# Patient Record
Sex: MALE | Race: WHITE
[De-identification: names, ages, dates, MRNs, and addresses within clinical notes are randomized per-mention and may not be internally consistent; named-entity substitution may affect disease eponyms.]

---

## 2019-03-30 ENCOUNTER — HOSPITAL ENCOUNTER (EMERGENCY)
Dept: HOSPITAL 41 - JD.ED | Age: 49
Discharge: NURSING, REHAB OR LONG-TERM CARE | End: 2019-03-30
Payer: MEDICAID

## 2019-03-30 DIAGNOSIS — I10: ICD-10-CM

## 2019-03-30 DIAGNOSIS — I61.9: Primary | ICD-10-CM

## 2019-03-30 NOTE — EDM.PDOC
ED HPI GENERAL MEDICAL PROBLEM





- General


Chief Complaint: Neuro Symptoms/Deficits


Stated Complaint: STROKE SYMPTOMS


Time Seen by Provider: 03/30/19 08:42


Source of Information: Reports: Patient, Family, RN Notes Reviewed (Son)





- History of Present Illness


INITIAL COMMENTS - FREE TEXT/NARRATIVE: 





49-year-old male is brought here by son private vehicle  with complete right-

sided paralysis. Reported to have been up to the bathroom about 5 hours ago 

with no difficulty walking or focal weakness. And when he awakened about an 

hour later, about 4:30 mountain time or 4 hours ago his right hand and leg were 

"paralyzed". He was asymptomatic yesterday and last evening. He has never had 

any stroke type symptoms in the past. He does have history of hypertension, on 

medication for about 2 years. He does smoke and also does drink alcohol.  He is 

on no prescription blood thinners. His son states that he does take ibuprofen 

on a when necessary basis.  He has moderate headache this morning. He speaks 

[place], does seem to understand English. His son does speak fluent English and 

has been interpreting for us. He does answer questions immediately and does 

obey commands. He states that it seems difficult to speak clearly but his son 

states his words are appropriate, just mildly slurred.





- Related Data


 Allergies











Allergy/AdvReac Type Severity Reaction Status Date / Time


 


No Known Allergies Allergy   Verified 03/30/19 09:04














ED ROS GENERAL





- Review of Systems


Review Of Systems: See Below


Constitutional: Denies: Fever, Diaphoresis


HEENT: Denies: Throat Pain, Throat Swelling


Respiratory: Denies: Shortness of Breath, Wheezing, Cough


Cardiovascular: Denies: Chest Pain


GI/Abdominal: Denies: Abdominal Pain, Nausea, Vomiting


Musculoskeletal: Denies: Neck Pain, Back Pain


Skin: Reports: No Symptoms


Neurological: Reports: Trouble Speaking (Speech is mildly slurred), Difficulty 

Walking (Unable to ambulate for the past 4 hours), Weakness (Patient arrives 

with complete paralysis right and lower upper extremities)





ED EXAM, NEURO





- Physical Exam


Exam: See Below


General Appearance: Alert, Anxious


Eye Exam: Bilateral Eye: PERRL, Other (Extraocular motion is conjugate, no 

deviated gaze)


Ears: Normal External Exam


Throat/Mouth: Normal Inspection


Head Exam: Atraumatic.  No: Facial Swelling


Neck: Supple, Full Range of Motion


Respiratory/Chest: No Respiratory Distress, Lungs Clear, Normal Breath Sounds


Cardiovascular: Regular Rate, Rhythm


GI/Abdominal: Soft, Non-Tender


Neurological: Alert, Other (On initial exam had complete paralysis right upper 

and right lower extremities, no facial droop)


Extremities: No: Pedal Edema, Leg Pain


Skin Exam: Warm, Dry, Normal Color





EKG INTERPRETATION


EKG Date: 03/30/19


Rhythm: NSR


Axis: Normal


P-Wave: Present


QRS: Normal


ST-T: Normal





Course





- Vital Signs


Last Recorded V/S: 


 Last Vital Signs











Temp  97.6 F   03/30/19 08:35


 


Pulse  72   03/30/19 08:35


 


Resp  16   03/30/19 08:35


 


BP  172/126 H  03/30/19 08:35


 


Pulse Ox  98   03/30/19 08:35














- Orders/Labs/Meds


Orders: 


 Active Orders 24 hr











 Category Date Time Status


 


 EKG 12 Lead [EKG Documentation Completion] [RC] STAT Care  03/30/19 08:43 

Active


 


 Peripheral IV Care [RC] .AS DIRECTED Care  03/30/19 08:45 Active


 


 Head wo Cont [CT] Stat Exams  03/30/19 08:44 Taken


 


 Sodium Chloride 0.9% [Saline Flush] Med  03/30/19 08:45 Active





 10 ml FLUSH ASDIRECTED PRN   


 


 Peripheral IV Insertion Adult [OM.PC] Stat Oth  03/30/19 08:45 Ordered








 Medication Orders





Sodium Chloride (Saline Flush)  10 ml FLUSH ASDIRECTED PRN


   PRN Reason: Keep Vein Open








Labs: 


 Laboratory Tests











  03/30/19 03/30/19 03/30/19 Range/Units





  08:45 08:45 08:45 


 


WBC  11.50 H    (4.23-9.07)  K/mm3


 


RBC  4.98    (4.63-6.08)  M/mm3


 


Hgb  15.4    (13.7-17.5)  gm/L


 


Hct  44.9    (40.1-51.0)  %


 


MCV  90.2    (79.0-92.2)  fl


 


MCH  30.9    (25.7-32.2)  pg


 


MCHC  34.3    (32.2-35.5)  g/dl


 


RDW Std Deviation  46.4 H    (35.1-43.9)  fL


 


Plt Count  332    (163-337)  K/mm3


 


MPV  9.6    (9.4-12.3)  fl


 


Neut % (Auto)  75.6 H    (34.0-67.9)  %


 


Lymph % (Auto)  17.7 L    (21.8-53.1)  %


 


Mono % (Auto)  5.2 L    (5.3-12.2)  %


 


Eos % (Auto)  1.0    (0.8-7.0)  


 


Baso % (Auto)  0.2    (0.1-1.2)  %


 


Neut # (Auto)  8.71 H    (1.78-5.38)  K/mm3


 


Lymph # (Auto)  2.03    (1.32-3.57)  K/mm3


 


Mono # (Auto)  0.60    (0.30-0.82)  K/mm3


 


Eos # (Auto)  0.11    (0.04-0.54)  K/mm3


 


Baso # (Auto)  0.02    (0.01-0.08)  K/mm3


 


Sodium   142   (136-145)  mEq/L


 


Potassium   3.7   (3.5-5.1)  mEq/L


 


Chloride   106   ()  mEq/L


 


Carbon Dioxide   25   (21-32)  mEq/L


 


Anion Gap   14.7   (5-15)  


 


BUN   11   (7-18)  mg/dL


 


Creatinine   1.1   (0.7-1.3)  mg/dL


 


Est Cr Clr Drug Dosing   73.31   mL/min


 


Estimated GFR (MDRD)   > 60   (>60)  mL/min


 


BUN/Creatinine Ratio   10.0 L   (14-18)  


 


Glucose   105   ()  mg/dL


 


POC Glucose    97  ()  mg/dL


 


Calcium   8.7   (8.5-10.1)  mg/dL


 


Total Bilirubin   0.5   (0.2-1.0)  mg/dL


 


AST   22   (15-37)  U/L


 


ALT   37   (16-63)  U/L


 


Alkaline Phosphatase   81   ()  U/L


 


Total Protein   7.8   (6.4-8.2)  g/dl


 


Albumin   3.5   (3.4-5.0)  g/dl


 


Globulin   4.3   gm/dL


 


Albumin/Globulin Ratio   0.8 L   (1-2)  











Meds: 


Medications











Generic Name Dose Route Start Last Admin





  Trade Name Freq  PRN Reason Stop Dose Admin


 


Sodium Chloride  10 ml  03/30/19 08:45  





  Saline Flush  FLUSH   





  ASDIRECTED PRN   





  Keep Vein Open   





     





     





     














Discontinued Medications














Generic Name Dose Route Start Last Admin





  Trade Name Ara  PRN Reason Stop Dose Admin


 


Sodium Chloride  500 mls @ 999 mls/hr  03/30/19 08:45  





  Normal Saline  IV  03/30/19 09:15  





  .BOLUS ONE   





     





     





     





     














- Re-Assessments/Exams


Free Text/Narrative Re-Assessment/Exam: 





03/30/19 09:26.  


This was called as a stroke alert on arrival at  8:36. I did see patient 

imediately.  As noted he came private vehicle so we had no prior warning. CT 

was called in immediately. Last known well was 5-5-1/2 hours prior to arrival. 

Onset of symptoms about 4 hours prior to arrival. Unfortunately CT does show 

hypertensive 1.5 x 2 cm hemorrhage left basal ganglia.  See radiology report 

for details. There is some compression of the third ventricle. We did activate 

[employer] immediately anticipating transfer to [facility]. I have discussed 

this with Neurologist, Dr Pompa on call for [facility] who does accept 

patient in transfer.  He is asking we start a nicardipine drip.  [employer] 

flight crew is here.   They are setting that up.  On recheck he now has some 

very slight grasp R hand, mild movement of R foot which he did not have on 

arrival.  His BP was elevated on arrival, most recent 168/112.  











Departure





- Departure


Time of Disposition: 09:30


Disposition: DC/[facility]fer to [facility]


Clinical Impression: 


 Hypertension





CVA (cerebrovascular accident due to intracerebral hemorrhage)


Qualifiers:


 Intracerebral hemorrhage etiology: nontraumatic Cerebral hemorrhage location: 

unspecified cerebral location Laterality: left Qualified Code(s): I61.9 - 

Nontraumatic intracerebral hemorrhage, unspecified








- Discharge Information


Referrals: 


PCP,None [Primary Care Provider] - 


Forms:  ED Department Discharge





- My Orders


Last 24 Hours: 


My Active Orders





03/30/19 08:43


EKG 12 Lead [EKG Documentation Completion] [RC] STAT 





03/30/19 08:44


Head wo Cont [CT] Stat 





03/30/19 08:45


Peripheral IV Care [RC] .AS DIRECTED 


Sodium Chloride 0.9% [Saline Flush]   10 ml FLUSH ASDIRECTED PRN 


Peripheral IV Insertion Adult [OM.PC] Stat 














- Assessment/Plan


Last 24 Hours: 


My Active Orders





03/30/19 08:43


EKG 12 Lead [EKG Documentation Completion] [RC] STAT 





03/30/19 08:44


Head wo Cont [CT] Stat 





03/30/19 08:45


Peripheral IV Care [RC] .AS DIRECTED 


Sodium Chloride 0.9% [Saline Flush]   10 ml FLUSH ASDIRECTED PRN 


Peripheral IV Insertion Adult [OM.PC] Stat

## 2019-03-31 NOTE — CT
Head CT

 

Technique: Multiple axial sections through the brain were obtained.  

Intravenous contrast was not utilized.

 

Comparison: No prior intracranial imaging is available.

 

Findings: Acute hemorrhage is identified within the left basal ganglia

 measuring approximately 2.1 cm x 1.5 cm.  This causes mass effect 

upon the lateral ventricle with effacement of the body and frontal 

horn.  Less effacement is noted of the occipital horn.

 

Ventricles are otherwise within normal limits.  Slight midline shift 

is seen by several millimeters.  No other areas of intraparenchymal 

hemorrhage are seen.  No abnormal parenchymal densities are otherwise 

seen.

 

Bone window settings were reviewed which show no acute calvarial 

abnormality.  Air-fluid level and mucosal thickening is seen within 

the right maxillary sinus.  Mucosal thickening and retention cyst is 

noted within the left maxillary sinus.  Left sided retention cyst 

measures 1.5 cm.

 

Impression:

1.  Sinus findings.  Most of findings appear to be chronic but 

air-fluid level noted within the right maxillary sinus which could 

represent acute sinusitis or retained secretions.

2.  Intracranial hemorrhage which appears acute within the left basal 

ganglia causing effacement of the lateral ventricle and mild midline 

shift as noted above.  Please correlate if patient has any history of 

hypertension as an etiology for this finding.

3.  No additional abnormality is appreciated.

 

Diagnostic code #5

 

I agree with preliminary report from [facility], finalized on 03/30/19, 

10:30  Central Time